# Patient Record
Sex: MALE | Race: WHITE | URBAN - METROPOLITAN AREA
[De-identification: names, ages, dates, MRNs, and addresses within clinical notes are randomized per-mention and may not be internally consistent; named-entity substitution may affect disease eponyms.]

---

## 2023-01-23 ENCOUNTER — OFFICE VISIT (OUTPATIENT)
Dept: ENT CLINIC | Age: 28
End: 2023-01-23
Payer: COMMERCIAL

## 2023-01-23 VITALS
HEIGHT: 71 IN | DIASTOLIC BLOOD PRESSURE: 76 MMHG | HEART RATE: 80 BPM | BODY MASS INDEX: 24.06 KG/M2 | SYSTOLIC BLOOD PRESSURE: 134 MMHG | WEIGHT: 171.9 LBS

## 2023-01-23 DIAGNOSIS — J35.01 CHRONIC TONSILLITIS: Primary | ICD-10-CM

## 2023-01-23 DIAGNOSIS — R09.82 POST-NASAL DRAINAGE: ICD-10-CM

## 2023-01-23 PROCEDURE — G8420 CALC BMI NORM PARAMETERS: HCPCS | Performed by: NURSE PRACTITIONER

## 2023-01-23 PROCEDURE — 99203 OFFICE O/P NEW LOW 30 MIN: CPT | Performed by: NURSE PRACTITIONER

## 2023-01-23 PROCEDURE — 4004F PT TOBACCO SCREEN RCVD TLK: CPT | Performed by: NURSE PRACTITIONER

## 2023-01-23 PROCEDURE — G8427 DOCREV CUR MEDS BY ELIG CLIN: HCPCS | Performed by: NURSE PRACTITIONER

## 2023-01-23 PROCEDURE — G8484 FLU IMMUNIZE NO ADMIN: HCPCS | Performed by: NURSE PRACTITIONER

## 2023-01-23 RX ORDER — CHLORHEXIDINE GLUCONATE 0.12 MG/ML
15 RINSE ORAL 2 TIMES DAILY
Qty: 420 ML | Refills: 0 | Status: SHIPPED | OUTPATIENT
Start: 2023-01-23 | End: 2023-02-06

## 2023-01-23 RX ORDER — IBUPROFEN 200 MG
200 TABLET ORAL EVERY 6 HOURS PRN
COMMUNITY

## 2023-01-23 RX ORDER — ACETAMINOPHEN 500 MG
500 TABLET ORAL EVERY 6 HOURS PRN
COMMUNITY

## 2023-01-23 ASSESSMENT — ENCOUNTER SYMPTOMS
EYES NEGATIVE: 1
RHINORRHEA: 0
RESPIRATORY NEGATIVE: 1
SORE THROAT: 0
SINUS PRESSURE: 0
SHORTNESS OF BREATH: 0
STRIDOR: 0
SINUS PAIN: 0
TROUBLE SWALLOWING: 0

## 2023-01-23 NOTE — PROGRESS NOTES
[facility] Otolaryngology  Dr. Ignacio Hinson D.O. Ms.Ed. New Consult       Patient Name:  Meghan Page  :  1995     CHIEF C/O:    Chief Complaint   Patient presents with    New Patient     NP discuss tonsil removal patient has had strep several times in the last 2 years  patient had in 10/22 finished abx and had strep again 2 weeks later       HISTORY OBTAINED FROM:  patient    HISTORY OF PRESENT ILLNESS:       Yulisa Antunez is a 32y.o. year old male, here today for chronic tonsillitis. Patient states symptoms for approximately 3 years. He states that during the past 3 years he has been treated for strep throat approximately 12 times by his PCP with confirmation via rapid strep. He states his last treatment for infection was approximately 1 month ago. He states with his infections there is significant swelling of his tonsils causing painful swallowing and some difficulty eating and drinking. He denies any previous history of tonsillitis in the past.  He states he noticed an increase in his symptoms when his children began going to school. He does complain of bilateral ear pain with infections. He denies any noticed or witnessed periods of apnea while sleeping. He does have persistent postnasal drainage symptoms and states he has been on several different nasal sprays and allergy treatments with little relief from his primary doctor. He denies a sore throat during today's visit. He denies current sinus congestion or rhinorrhea. He does complain of minimal chronic postnasal drainage at this time. Past Medical History:   Diagnosis Date    Asthma      History reviewed. No pertinent surgical history.     Current Outpatient Medications:     acetaminophen (TYLENOL) 500 MG tablet, Take 500 mg by mouth every 6 hours as needed for Pain, Disp: , Rfl:     ibuprofen (ADVIL;MOTRIN) 200 MG tablet, Take 200 mg by mouth every 6 hours as needed for Pain, Disp: , Rfl:     chlorhexidine (PERIDEX) 0.12 % solution, Swish and spit 15 mLs 2 times daily for 14 days Gargle aggressively for 30-60 seconds, Disp: 420 mL, Rfl: 0  Benadryl [diphenhydramine] and Orange  Social History     Tobacco Use    Smoking status: Some Days     Types: Cigarettes    Tobacco comments:     Vapes socially   Substance Use Topics    Alcohol use: Yes     Comment: social    Drug use: No     History reviewed. No pertinent family history. Review of Systems   Constitutional: Negative. Negative for activity change and appetite change. HENT:  Positive for postnasal drip. Negative for congestion, ear pain, rhinorrhea, sinus pressure, sinus pain, sore throat and trouble swallowing. Eyes: Negative. Respiratory: Negative. Negative for shortness of breath and stridor. Cardiovascular: Negative. Negative for chest pain and palpitations. Endocrine: Negative. Musculoskeletal: Negative. Skin: Negative. Neurological: Negative. Negative for dizziness. Hematological: Negative. Psychiatric/Behavioral: Negative. /76 (Site: Left Upper Arm, Position: Sitting, Cuff Size: Medium Adult)   Pulse 80   Ht 5' 11\" (1.803 m)   Wt 171 lb 14.4 oz (78 kg)   BMI 23.98 kg/m²   Physical Exam  Constitutional:       Appearance: Normal appearance. HENT:      Head: Normocephalic. Right Ear: Tympanic membrane, ear canal and external ear normal.      Left Ear: Tympanic membrane, ear canal and external ear normal.      Nose: Nose normal. No rhinorrhea. Right Turbinates: Not pale. Left Turbinates: Not pale. Mouth/Throat:      Lips: Pink. Mouth: Mucous membranes are moist.      Dentition: Does not have dentures. Tonsils: 2+ on the right. 2+ on the left. Comments: 2+ tonsils bilaterally that are cryptic in appearance without current exudate or erythema. Eyes:      Conjunctiva/sclera: Conjunctivae normal.      Pupils: Pupils are equal, round, and reactive to light.    Cardiovascular:      Rate and Rhythm: Normal rate and regular rhythm. Pulses: Normal pulses. Pulmonary:      Effort: Pulmonary effort is normal. No respiratory distress. Breath sounds: No stridor. Musculoskeletal:         General: Normal range of motion. Cervical back: Normal range of motion. No rigidity. No muscular tenderness. Skin:     General: Skin is warm and dry. Neurological:      General: No focal deficit present. Mental Status: He is alert and oriented to person, place, and time. Psychiatric:         Mood and Affect: Mood normal.         Behavior: Behavior normal.         Thought Content: Thought content normal.         Judgment: Judgment normal.       IMPRESSION/PLAN:      Harry Fletcher was seen today for new patient. Diagnoses and all orders for this visit:    Chronic tonsillitis    Post-nasal drainage    Other orders  -     chlorhexidine (PERIDEX) 0.12 % solution; Swish and spit 15 mLs 2 times daily for 14 days Gargle aggressively for 30-60 seconds    Patient is seen examined today for a history of chronic tonsillitis with recurrent strep infections. At this time he will be placed on Peridex mouthwash, 15 mL to be gargled twice daily for 14 days. It is discussed with the patient that due to his chronic symptoms and recurrence of strep infections he would meet criteria for tonsillectomy. Risks and benefits of the procedure are discussed with the patient who states he would like to discuss further with his wife prior to committing to surgical intervention. He will continue with his Peridex mouthwash at this time with nasal saline spray, 2 sprays each nostril 3-4 times daily for postnasal drainage. He will follow-up in 1 month. He is instructed to call for any new or worsening of symptoms prior to his next appointment or if he decides to schedule his tonsillectomy.     Jason Scott, JODI, FNP-C  [facility], Nose and Throat    The information contained in this note has been dictated using drug and medical speech recognition software and may contain errors

## 2024-03-31 ENCOUNTER — HOSPITAL ENCOUNTER (EMERGENCY)
Age: 29
Discharge: HOME | End: 2024-03-31
Payer: COMMERCIAL

## 2024-03-31 VITALS
OXYGEN SATURATION: 98 % | WEIGHT: 175 LBS | BODY MASS INDEX: 24.41 KG/M2 | RESPIRATION RATE: 16 BRPM | DIASTOLIC BLOOD PRESSURE: 76 MMHG | HEART RATE: 72 BPM | SYSTOLIC BLOOD PRESSURE: 128 MMHG | TEMPERATURE: 97.5 F

## 2024-03-31 DIAGNOSIS — H65.01 RIGHT ACUTE SEROUS OTITIS MEDIA, RECURRENCE NOT SPECIFIED: Primary | ICD-10-CM

## 2024-03-31 PROCEDURE — 99283 EMERGENCY DEPT VISIT LOW MDM: CPT

## 2024-03-31 PROCEDURE — 6370000000 HC RX 637 (ALT 250 FOR IP): Performed by: PHYSICIAN ASSISTANT

## 2024-03-31 RX ORDER — ACETAMINOPHEN 325 MG/1
650 TABLET ORAL ONCE
Status: COMPLETED | OUTPATIENT
Start: 2024-03-31 | End: 2024-03-31

## 2024-03-31 RX ORDER — CEFDINIR 300 MG/1
300 CAPSULE ORAL 2 TIMES DAILY
Qty: 14 CAPSULE | Refills: 0 | Status: SHIPPED | OUTPATIENT
Start: 2024-03-31 | End: 2024-04-07

## 2024-03-31 RX ORDER — CEFDINIR 300 MG/1
300 CAPSULE ORAL ONCE
Status: COMPLETED | OUTPATIENT
Start: 2024-03-31 | End: 2024-03-31

## 2024-03-31 RX ADMIN — CEFDINIR 300 MG: 300 CAPSULE ORAL at 21:30

## 2024-03-31 RX ADMIN — ACETAMINOPHEN 650 MG: 325 TABLET ORAL at 21:30

## 2024-04-01 NOTE — ED PROVIDER NOTES
Independent [facility]OS Visit.   HPI:  3/31/24, Time: 8:56 PM EDT         Fareed French is a 28 y.o. male presenting to the ED for right ear pain, beginning 1 week ago.  The complaint has been persistent, moderate in severity, and worsened by nothing.     Patient states he had influenza 1 week ago.  On Monday started with right ear pain that is progressively worse.  He did follow with his primary care was placed on Cipro eardrops which she was unable to get filled at the pharmacy.  He states the pain is progressively worse.  No drainage from the ear hearing loss.  States the pain radiates towards the jaw.  He denies any dental pain.  Review of Systems:   A complete review of systems was performed and pertinent positives and negatives are stated within HPI, all other systems reviewed and are negative.          --------------------------------------------- PAST HISTORY ---------------------------------------------  Past Medical History:  has a past medical history of Asthma.    Past Surgical History:  has no past surgical history on file.    Social History:  reports that he has been smoking cigarettes. He does not have any smokeless tobacco history on file. He reports current alcohol use. He reports that he does not use drugs.    Family History: family history is not on file.     The patient’s home medications have been reviewed.    Allergies: Benadryl [diphenhydramine] and Orange    -------------------------------------------------- RESULTS -------------------------------------------------  All laboratory and radiology results have been personally reviewed by myself   LABS:  No results found for this visit on 03/31/24.    RADIOLOGY:  Interpreted by Radiologist.  No orders to display       ------------------------- NURSING NOTES AND VITALS REVIEWED ---------------------------   The nursing notes within the ED encounter and vital signs as below have been reviewed.   /76   Pulse 72   Temp 97.5 °F (36.4 °C)

## 2024-04-01 NOTE — DISCHARGE INSTR - COC
Continuity of Care Form    Patient Name: Fareed French   :  1995  MRN:  [id number]    Admit date:  3/31/2024  Discharge date:  ***    Code Status Order: No Order   Advance Directives:     Admitting Physician:  No admitting provider for patient encounter.  PCP: No primary care provider on file.    Discharging Nurse: ***  Discharging Hospital Unit/Room#: IZGRVB27/INT-01  Discharging Unit Phone Number: ***    Emergency Contact:   Extended Emergency Contact Information  Primary Emergency Contact: Jennifer Truong  Home Phone: [phone]  Relation: Other    Past Surgical History:  History reviewed. No pertinent surgical history.    Immunization History:     There is no immunization history on file for this patient.    Active Problems:  There is no problem list on file for this patient.      Isolation/Infection:   Isolation            No Isolation          Patient Infection Status       None to display            Nurse Assessment:  Last Vital Signs: /76   Pulse 72   Temp 97.5 °F (36.4 °C) (Temporal)   Resp 16   Wt 79.4 kg (175 lb)   SpO2 98%   BMI 24.41 kg/m²     Last documented pain score (0-10 scale):    Last Weight:   Wt Readings from Last 1 Encounters:   24 79.4 kg (175 lb)     Mental Status:  {IP PT MENTAL STATUS:}    IV Access:  { ROSA IV ACCESS:758531084}    Nursing Mobility/ADLs:  Walking   {CHP DME ADLs:412930118}  Transfer  {CHP DME ADLs:641855340}  Bathing  {CHP DME ADLs:018986036}  Dressing  {CHP DME ADLs:369719796}  Toileting  {CHP DME ADLs:225372390}  Feeding  {CHP DME ADLs:959994154}  Med Admin  {CHP DME ADLs:339691492}  Med Delivery   { ROSA MED Delivery:442236620}    Wound Care Documentation and Therapy:        Elimination:  Continence:   Bowel: {YES / NO:}  Bladder: {YES / NO:}  Urinary Catheter: {Urinary Catheter:678351621}   Colostomy/Ileostomy/Ileal Conduit: {YES / NO:}       Date of Last BM: ***  No intake or output data in the 24 hours ending 24